# Patient Record
Sex: MALE | Race: OTHER | HISPANIC OR LATINO | Employment: FULL TIME | URBAN - METROPOLITAN AREA
[De-identification: names, ages, dates, MRNs, and addresses within clinical notes are randomized per-mention and may not be internally consistent; named-entity substitution may affect disease eponyms.]

---

## 2020-05-21 ENCOUNTER — APPOINTMENT (OUTPATIENT)
Dept: RADIOLOGY | Facility: AMBULARY SURGERY CENTER | Age: 25
End: 2020-05-21
Payer: COMMERCIAL

## 2020-05-21 VITALS
BODY MASS INDEX: 22.22 KG/M2 | SYSTOLIC BLOOD PRESSURE: 100 MMHG | DIASTOLIC BLOOD PRESSURE: 61 MMHG | HEART RATE: 98 BPM | WEIGHT: 150 LBS | HEIGHT: 69 IN

## 2020-05-21 DIAGNOSIS — M54.2 NECK PAIN: Primary | ICD-10-CM

## 2020-05-21 DIAGNOSIS — M54.2 NECK PAIN: ICD-10-CM

## 2020-05-21 PROCEDURE — 99203 OFFICE O/P NEW LOW 30 MIN: CPT | Performed by: PHYSICAL MEDICINE & REHABILITATION

## 2020-05-21 PROCEDURE — 72040 X-RAY EXAM NECK SPINE 2-3 VW: CPT

## 2020-05-21 RX ORDER — NAPROXEN 500 MG/1
500 TABLET ORAL 2 TIMES DAILY PRN
Qty: 60 TABLET | Refills: 1 | Status: CANCELLED | OUTPATIENT
Start: 2020-05-21

## 2020-05-22 RX ORDER — NAPROXEN 500 MG/1
500 TABLET ORAL 2 TIMES DAILY PRN
Qty: 60 TABLET | Refills: 1 | Status: SHIPPED | OUTPATIENT
Start: 2020-05-22 | End: 2020-06-09

## 2020-06-08 DIAGNOSIS — M54.2 NECK PAIN: ICD-10-CM

## 2020-06-09 RX ORDER — NAPROXEN 500 MG/1
TABLET ORAL
Qty: 60 TABLET | Refills: 1 | Status: SHIPPED | OUTPATIENT
Start: 2020-06-09 | End: 2020-07-20

## 2020-07-17 ENCOUNTER — OFFICE VISIT (OUTPATIENT)
Dept: OBGYN CLINIC | Facility: CLINIC | Age: 25
End: 2020-07-17
Payer: COMMERCIAL

## 2020-07-17 VITALS
WEIGHT: 150 LBS | HEART RATE: 77 BPM | BODY MASS INDEX: 22.22 KG/M2 | DIASTOLIC BLOOD PRESSURE: 63 MMHG | HEIGHT: 69 IN | SYSTOLIC BLOOD PRESSURE: 114 MMHG

## 2020-07-17 DIAGNOSIS — M54.2 NECK PAIN: Primary | ICD-10-CM

## 2020-07-17 DIAGNOSIS — M79.18 CERVICAL MYOFASCIAL PAIN SYNDROME: ICD-10-CM

## 2020-07-17 PROCEDURE — 99213 OFFICE O/P EST LOW 20 MIN: CPT | Performed by: PHYSICAL MEDICINE & REHABILITATION

## 2020-07-17 RX ORDER — LIDOCAINE 50 MG/G
1 PATCH TOPICAL DAILY
Qty: 15 PATCH | Refills: 1 | Status: SHIPPED | OUTPATIENT
Start: 2020-07-17

## 2020-07-17 NOTE — PROGRESS NOTES
1  Neck pain  lidocaine (LIDODERM) 5 %    diclofenac sodium (VOLTAREN) 1 %    Ambulatory referral to Physical Therapy   2  Cervical myofascial pain syndrome       Orders Placed This Encounter   Procedures    Ambulatory referral to Physical Therapy        Imaging Studies (I personally reviewed images in PACS and report if it was available):  Cervical spine x-rays that are most recent to this encounter were reviewed  These images show no severe degeneration or acute osseous abnormalities      Impression:  The patient's pain is multifactorial and is predominantly due to right myofascial spasticity, muscular strain and postural/core instability  Unlikely that this is a cervical radiculopathy  He remains neurologically intact  He presents in follow-up  Since his last visit, he discontinue the naproxen due to feeling chest pressure  He went to 1 session of physical therapy  We discussed that the mainstay of treatment for his condition is physical therapy  For pain control, I prescribed him Lidoderm patches and Voltaren gel that he can use as needed  I will see him back only after he has had at least 5-6 weeks of physical therapy  Return in about 6 weeks (around 8/28/2020)  HPI:  Jayson Jerez is a 22 y o  male  who presents in follow up  Here for   Chief Complaint   Patient presents with    Follow-up       Date of injury:  Chronic pain that he has had for many years  Trajectory of symptoms:  No change since his last visit-see above  Review of Systems   Constitutional: Positive for activity change  Negative for fever  HENT: Negative for trouble swallowing  Eyes: Negative for visual disturbance  Respiratory: Negative for shortness of breath  Cardiovascular: Negative for chest pain  Gastrointestinal: Negative for nausea  Endocrine: Negative for polydipsia  Genitourinary: Negative for difficulty urinating  Musculoskeletal: Positive for neck pain and neck stiffness   Negative for arthralgias and gait problem  Skin: Negative for rash  Allergic/Immunologic: Negative for immunocompromised state  Neurological: Negative for dizziness, weakness and numbness  Hematological: Does not bruise/bleed easily  Psychiatric/Behavioral: Negative for decreased concentration  Following history reviewed and updated:  History reviewed  No pertinent past medical history  History reviewed  No pertinent surgical history  Social History   Social History     Substance and Sexual Activity   Alcohol Use Not Currently     Social History     Substance and Sexual Activity   Drug Use Not Currently     Social History     Tobacco Use   Smoking Status Never Smoker   Smokeless Tobacco Never Used     History reviewed  No pertinent family history  No Known Allergies     Constitutional:  /63   Pulse 77   Ht 5' 9" (1 753 m)   Wt 68 kg (150 lb)   BMI 22 15 kg/m²    General: NAD  Eyes: Clear sclerae  ENT: No inflammation, lesion, or mass of lips  No tracheal deviation  Musculoskeletal: As mentioned below  Integumentary: No visible rashes or skin lesions  Pulmonary/Chest: Effort normal  No respiratory distress  Neuro: CN's grossly intact, STARKS  Psych: Normal affect and judgement  Vascular: WWP  Back Exam     Tenderness   The patient is experiencing tenderness in the cervical     Range of Motion   Lateral bend right: abnormal   Lateral bend left: normal   Rotation right: normal   Rotation left: normal     Muscle Strength   The patient has normal back strength  Other   Sensation: normal  Gait: normal   Erythema: no back redness  Scars: absent    Comments:  Neurologically intact throughout  No midline tenderness  Procedures - none for this visit  Portions of the record may have been created with voice recognition software  Occasional wrong word or "sound a like" substitutions may have occurred due to the inherent limitations of voice recognition software    Read the chart carefully and recognize, using context, where substitutions have occurred

## 2020-07-20 DIAGNOSIS — M54.2 NECK PAIN: ICD-10-CM

## 2020-07-20 RX ORDER — NAPROXEN 500 MG/1
TABLET ORAL
Qty: 60 TABLET | Refills: 1 | Status: SHIPPED | OUTPATIENT
Start: 2020-07-20 | End: 2020-08-05

## 2020-07-20 NOTE — TELEPHONE ENCOUNTER
Patient sees Dr Harini Echeverria  Patient is calling in wanting to get his update PT script faxed over to the office      Fax# [phone]

## 2020-08-05 DIAGNOSIS — M54.2 NECK PAIN: ICD-10-CM

## 2020-08-05 RX ORDER — NAPROXEN 500 MG/1
TABLET ORAL
Qty: 60 TABLET | Refills: 1 | Status: SHIPPED | OUTPATIENT
Start: 2020-08-05

## 2020-09-22 DIAGNOSIS — M54.2 NECK PAIN: ICD-10-CM

## 2020-09-24 DIAGNOSIS — M54.2 NECK PAIN: ICD-10-CM

## 2020-09-24 RX ORDER — NAPROXEN 500 MG/1
TABLET ORAL
Qty: 60 TABLET | Refills: 1 | OUTPATIENT
Start: 2020-09-24

## 2020-10-20 DIAGNOSIS — M54.2 NECK PAIN: ICD-10-CM

## 2020-10-20 RX ORDER — NAPROXEN 500 MG/1
TABLET ORAL
Qty: 60 TABLET | Refills: 1 | OUTPATIENT
Start: 2020-10-20

## 2020-10-22 DIAGNOSIS — M54.2 NECK PAIN: ICD-10-CM

## 2020-10-22 RX ORDER — NAPROXEN 500 MG/1
TABLET ORAL
Qty: 60 TABLET | Refills: 1 | OUTPATIENT
Start: 2020-10-22

## 2020-10-23 DIAGNOSIS — M54.2 NECK PAIN: ICD-10-CM

## 2020-10-23 RX ORDER — NAPROXEN 500 MG/1
TABLET ORAL
Qty: 60 TABLET | Refills: 1 | OUTPATIENT
Start: 2020-10-23

## 2020-10-24 DIAGNOSIS — M54.2 NECK PAIN: ICD-10-CM

## 2020-11-03 RX ORDER — NAPROXEN 500 MG/1
TABLET ORAL
Qty: 60 TABLET | Refills: 1 | OUTPATIENT
Start: 2020-11-03

## 2020-11-04 DIAGNOSIS — M54.2 NECK PAIN: ICD-10-CM

## 2020-12-17 RX ORDER — NAPROXEN 500 MG/1
TABLET ORAL
Qty: 60 TABLET | Refills: 1 | OUTPATIENT
Start: 2020-12-17